# Patient Record
Sex: FEMALE | Race: WHITE | NOT HISPANIC OR LATINO | Employment: OTHER | URBAN - METROPOLITAN AREA
[De-identification: names, ages, dates, MRNs, and addresses within clinical notes are randomized per-mention and may not be internally consistent; named-entity substitution may affect disease eponyms.]

---

## 2017-03-30 ENCOUNTER — PATIENT OUTREACH (OUTPATIENT)
Dept: HEALTH INFORMATION MANAGEMENT | Facility: OTHER | Age: 82
End: 2017-03-30

## 2017-03-30 NOTE — PROGRESS NOTES
Attempt #:1    Verify PCP: yes    Communication Preference Obtained: yes     Review Care Team: no    Annual Wellness Visit Scheduling  1. Scheduling Status:Not Scheduled. Patient states they are not interested -- VISIT NOT WITH PCP     Care Coordination Enrollment (Attempt to Enroll in Care Coordination)  2. Is patient appropriate:   3. Is patient interested:     MyChart Activation: declined  [employer]: no  Virtual Visits: no  Opt In to Text Messages: no

## 2017-04-06 PROBLEM — J90 PLEURAL EFFUSION DUE TO BACTERIAL INFECTION: Status: ACTIVE | Noted: 2017-04-06

## 2017-04-06 PROBLEM — B96.89 PLEURAL EFFUSION DUE TO BACTERIAL INFECTION: Status: ACTIVE | Noted: 2017-04-06

## 2017-04-06 PROBLEM — R06.02 SHORTNESS OF BREATH: Status: ACTIVE | Noted: 2017-04-06

## 2017-04-06 PROBLEM — J18.9 PNEUMONIA: Status: ACTIVE | Noted: 2017-04-06

## 2017-04-10 PROBLEM — R06.02 SHORTNESS OF BREATH: Status: RESOLVED | Noted: 2017-04-06 | Resolved: 2017-04-10

## 2017-04-10 PROBLEM — J18.9 PNEUMONIA: Status: RESOLVED | Noted: 2017-04-06 | Resolved: 2017-04-10
